# Patient Record
Sex: MALE | Employment: FULL TIME | URBAN - METROPOLITAN AREA
[De-identification: names, ages, dates, MRNs, and addresses within clinical notes are randomized per-mention and may not be internally consistent; named-entity substitution may affect disease eponyms.]

---

## 2017-07-12 ENCOUNTER — HOSPITAL ENCOUNTER (OUTPATIENT)
Dept: PHYSICAL THERAPY | Age: 32
Discharge: HOME | End: 2017-07-12
Payer: COMMERCIAL

## 2017-07-12 PROCEDURE — 97530 THERAPEUTIC ACTIVITIES: CPT

## 2017-07-12 PROCEDURE — 97162 PT EVAL MOD COMPLEX 30 MIN: CPT

## 2017-07-12 NOTE — PROGRESS NOTES
[facility] at [address] [place] [address]  Phone: [phone]   Fax: [phone]    Plan of Care/ Statement of Necessity for Physical Therapy Services     Patient name: Brenda Childress Start of Care: 2017   Referral source: Jamil Lopez MD : 1985    Medical Diagnosis: Low back pain [M54.5]   Onset Date:2017    Treatment Diagnosis: low back pain   Prior Hospitalization: see medical history Provider#: [id number]   Medications: Verified on Patient summary List    Comorbidities: diabetes   Prior Level of Function: 1-2 incidents of low back pain but not limiting like this episode beginning May 2017; hospitalized from -2016 and in medically induced coma for 3 mo with reports of atrophy and diminished health since    The Plan of Care and following information is based on the information from the initial evaluation. Assessment/ key information: Pt is a 33 y.o. male presenting to therapy with right sided sciatica and diagnosis of low back pain per MD script. Pt impairments include decreased lumbar ROM (right>left SB ROM), decreased LE strength (gluts and abd), restrictions in right hip ER/IR, tenderness to palpation of piriformis and glut, and right buttocks/posterior upper thigh pain. Neg slump and SLR testing. Pt functional limitations include pain with standing, turning in bed, walking, certain movements. Pt treatment diagnosis consistent with piriformis syndrome. Pt would benefit from skilled PT to improve impairments listed above and return pt to prior level of function of no pain with activities.      Evaluation Complexity History MEDIUM  Complexity : 1-2 comorbidities / personal factors will impact the outcome/ POC ; Examination HIGH Complexity : 4+ Standardized tests and measures addressing body structure, function, activity limitation and / or participation in recreation  ;Presentation MEDIUM Complexity : Evolving with changing characteristics ;Clinical Decision Making MEDIUM Complexity : FOTO score of 26-74  Overall Complexity Rating: MEDIUM  Problem List: pain affecting function, decrease ROM, decrease strength, impaired gait/ balance, decrease ADL/ functional abilitiies, decrease activity tolerance, decrease flexibility/ joint mobility and decrease transfer abilities   Treatment Plan may include any combination of the following: Therapeutic exercise, Therapeutic activities, Neuromuscular re-education, Physical agent/modality, Gait/balance training, Manual therapy, Patient education and Functional mobility training  Patient / Family readiness to learn indicated by: asking questions, trying to perform skills and interest  Persons(s) to be included in education: patient (P)  Barriers to Learning/Limitations: None  Patient Goal (s): Make sciatica go away. Not be bothered by pain  Patient Self Reported Health Status: fair  Rehabilitation Potential: good    Short Term Goals: STG- To be accomplished in 2 week(s):  1. Pt will be independent with HEP to encourage prophylaxis. Eval:dispensed  Current: NA     Long Term Goals: LTG- To be accomplished in 6 week(s):  1. Pt will report pain at worst does not exceed 3/10 and 50% improvement in right LE symptoms to improve quality of life. Eval:8-9/10, baseline constant right buttocks pain  Current: NA     2.  Pt will demonstrate equal and pain-free SB ROM to improve tolerance to standing and bending for ADLs and household chores. Eval:25\" from floor right and pain, 19\" from floor left and no pain  Current: NA     3.  Pt hip abd and ext strength will improve to 4+/5 to allow pt to walk with no restrictions or limiting pain. Eval:                                       Left              Right   Hip abd 4 4- pain []   Hip add    4 []   Gluteus Yaya 3+ pain 3+ pain []   Current: NA     4. Pt FOTO score will increase by >10 points to show improvement in right LE/low back function.   Eval:53  Current: will address at visit 5    Frequency / Duration: Patient to be seen 2 times per week for 6 weeks. Patient/ Caregiver education and instruction: Diagnosis, prognosis, self care, activity modification and exercises   [x]  Plan of care has been reviewed with SANTO Wisdom, PT 7/12/2017 2:15 PM  _____________________________________________________________________  I certify that the above Therapy Services are being furnished while the patient is under my care. I agree with the treatment plan and certify that this therapy is necessary.     Physician's Signature:____________________  Date:__________Time:______    Please sign and return to [facility] at [address] [place] [address]  Phone: [phone]   Fax: [phone]

## 2017-07-12 NOTE — PROGRESS NOTES
PT DAILY TREATMENT NOTE/LUMBAR EVAL 3-16    Patient Name: Giovani Schaffer  Date:2017  : 1985  [x]  Patient  Verified  Payor: Shankar Manriquez / Plan: [employer] / Product Type: HMO /    In time:218  Out time:255  Total Treatment Time (min): 37  Visit #: 1 of 12    Treatment Area: Low back pain [M54.5]  SUBJECTIVE  Pain Level (0-10 scale): 3  []constant []intermittent []improving []worsening []no change since onset    Any medication changes, allergies to medications, adverse drug reactions, diagnosis change, or new procedure performed?: [x] No    [] Yes (see summary sheet for update)  Subjective functional status/changes:     Pain:  _8-9_/10 max       _2-3_/10 min     __3__/10 now    Location: points to right buttocks and upper thigh     [X] Sharp    [ ] Frances Ludy [ ] Karn Bob [X]  Aching     [ ] Beverly Harsh [ ] Alexey Lakisha [X] Other:  feels weak      [ Rebekah Tl [X] Intermittent        Weakness/Popping/clicking/giving way- denies  Numbness/tingling? Denies  Pain at night- denies     Social/Recreation       Hobbies-       Not affected           Aggravating factors- standing, turning in bed  Relieving factors- sitting      PLOF: 1-2 incidents of low back pain but not limiting like this episode beginning May 2017  Limitations to PLOF: pain  Mechanism of Injury: Pt reports low back and right sided buttocks pain initiated following sleeping on couch for several nights in beginning of May. Traveled to [place], when running for the plane cause pain and was very painful next AM.  Pt reports constant pain since incident, gradually improving with medication. Pt reports trailing TENS, ice, heat with no improvements. Pt had XRAY, negative findings. Pt was hospitalized for 2 mo (2016), on echmo machine, medically induced coma for 3 weeks- significant deterioration and atrophy noted, required PT and speech following.      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 Not be bothered by pain\"  Barriers: []pain []financial []time []transportation []other  Motivation: good   Substance use: []Alcohol []Tobacco []other:   FABQ Score: []low []elevate  Cognition: A & O x 4    Other:    OBJECTIVE/EXAMINATION    27 min [x]Eval                  []Re-Eval       10 min Therapeutic Activity:  []  See flow sheet : Discussed and reviewed diagnosis, prognosis, POC, HEP   Rationale: increase ROM, increase strength, improve coordination, improve balance and increase proprioception  to improve the patients ability to perform ADL's with reduced pain levels. With   [] TE   [] TA   [] neuro   [] other: Patient Education: [x] Review HEP    [] Progressed/Changed HEP based on:   [] positioning   [] body mechanics   [] transfers   [] heat/ice application    [] other:      Other Objective/Functional Measures:     Physical Therapy Evaluation - Lumbar Spine ([facility])    SUBJECTIVE  Chief Complaint:    Mechanism of injury:    Symptoms:  Aggravated by:   [] Bending [] Sitting [] Standing [] Walking   [] Moving [] Cough [] Sneeze [] Valsalva   [] AM  [] PM  Lying:  [] sup   [] pro   [] sidelying   [] Other:     Eased by:    [] Bending [] Sitting [] Standing [] Walking   [] Moving [] AM  [] PM  Lying: [] sup  [] pro  [] sidelying   [] Other:     General Health:  Red Flags Indicated? [] Yes    [] No  [] Yes [] No Recent weight change (If yes, due to dieting?  [] Yes  [] No)   [] Yes [] No Weakness in legs during walking  [] Yes [] No Unremitting pain at night  [] Yes [] No Abdominal pain or problems  [] Yes [] No Rectal bleeding  [] Yes [] No Feet more cold or painful in cold weather  [] Yes [] No Menstrual irregularities  [] Yes [] No Blood or pain with urination  [] Yes [] No Dysfunction of bowel or bladder  [] Yes [] No Recent illness within past 3 weeks (i.e, cold, flu)  [] Yes [] No Numbness/tingling in buttock/genitalia region    Past History/Treatments:     Diagnostic Tests: [] Lab work [] X-rays    [] CT [] MRI     [] Other:  Results:    Functional Status  Prior level of function:  Present functional limitations:  What position do you sleep in?:    OBJECTIVE  Posture:  Lateral Shift: [] R    [] L     [] +  [] -  Kyphosis: [] Increased [x] Decreased   []  WNL  Lordosis:  [] Increased [x] Decreased   [] WNL  Pelvic symmetry: [] WNL    [] Other:    Gait:  [] Normal     [] Abnormal:    Active Movements: [] N/A   [] Too acute   [] Other:  ROM % AROM % PROM Comments:pain, area   Forward flexion 40-60 4\"     Extension 20-30 50%     SB right 20-30 25\"  pain   SB left 20-30 19\"     Rotation right 5-10      Rotation left 5-10        Repeated Movements   Effects on present pain: produces (TX), abolishes (A), increases (incr), decreases (decr), centralizes (C), peripheral (PH), no effect (NE)   Pre-Test Sx Flexion Repeated Flexion Extension Repeated Extension Repeated SBL Repeated SBR   Sitting          Standing          Lying      N/A N/A   Comments:  Side Glide:  Sustained passive positioning test:    Neuro Screen [x] WNL  Myotome/Dermatome/Reflexes:  Comments:    Dural Mobility:  SLR Supine: [] R    [x] L    [] +    [x] -  @ (degrees):   Slump Test: [x] R    [x] L    [] +    [x] -  @ (degrees):   Prone Knee Bend: [] R    [] L    [] +    [] -     Palpation  [] Min  [x] Mod  [] Severe    Location: piriformis, glut on right side  [] Min  [] Mod  [] Severe    Location:  [] Min  [] Mod  [] Severe    Location:    Strength   L(0-5) R (0-5) N/T   Hip Flexion (L1,2) 4+ 4+ []   Knee Extension (L3,4) 4+ 4+ []   Ankle Dorsiflexion (L4) 5 5 []   Great Toe Extension (L5)   []   Ankle Plantarflexion (S1) 5 5 []   Knee Flexion (S1,2) 4 4 []   Upper Abdominals   []   Lower Abdominals   []   Hip abd 4 4- pain []   Hip add  4 []   Gluteus Yaya 3+ pain 3+ pain []   Other   []     Special Tests  Lumbar: Lumb. Compression: [] Pos  [] Neg               Lumbar Distraction:   [] Pos  [] Neg    Quadrant:  [] Pos  [] Neg   [] Flex  [] Ext    Sacroilliac:  Gaenslen's: [] R    [] L    [] +    [] -     Compression: [] +    [] -     Gapping:  [] +    [] -     Thigh Thrust: [] R    [] L    [] +    [] -     Leg Length: [] +    [] -   Position:    Crests:    ASIS:    PSIS:    Sacral Sulcus:    Mobility: Standing flex:     Sitting flex:     Supine to sit:     Prone knee bend:         Hip: Jhonny Saner:  [] R    [] L    [] +    [] -     Scour:  [] R    [] L    [] +    [] -     Piriformis: [] R    [] L    [] +    [] -          Deficits: Klaudia's: [] R    [] L    [] +    [] -     Fer: [] R    [] L    [] +    [] -     Hamstrings 90/90:    Gastrocsoleus (to neutral): Right: Left:       Global Muscular Weakness:  Abdominals:  Quadratus Lumborum:  Paraspinals: Other:     Other tests/comments: ER/IR left hip 29/18    ER/IR right hip 43/22    Pain Level (0-10 scale) post treatment: 4    ASSESSMENT/Changes in Function: Pt is a 33 y.o. male presenting to therapy with right sided sciatica and diagnosis of low back pain per MD script. Pt impairments include decreased lumbar ROM (right>left SB ROM), decreased LE strength (gluts and abd), restrictions in right hip ER/IR, tenderness to palpation of piriformis and glut. Neg slump and SLR testing. Pt functional limitations include pain with standing, turning in bed, walking, certain sharp movements. Pt treatment diagnosis consistent with piriformis syndrome. Pt would benefit from skilled PT to improve impairments listed above and return pt to prior level of function of no pain with activities.      Patient will continue to benefit from skilled PT services to modify and progress therapeutic interventions, address functional mobility deficits, address ROM deficits, address strength deficits, analyze and address soft tissue restrictions, analyze and cue movement patterns, analyze and modify body mechanics/ergonomics, assess and modify postural abnormalities and instruct in home and community integration to attain remaining goals. [x]  See Plan of Care  []  See progress note/recertification  []  See Discharge Summary         Progress towards goals / Updated goals:  Short Term Goals: STG- To be accomplished in 2 week(s):  1. Pt will be independent with HEP to encourage prophylaxis. Eval:dispensed  Current: NA    Long Term Goals: LTG- To be accomplished in 6 week(s):  1. Pt will report pain at worst does not exceed 3/10 and 50% improvement in right LE symptoms to improve quality of life. Eval:8-9/10, baseline constant right buttocks pain  Current: NA    2. Pt will demonstrate equal and pain-free SB ROM to improve tolerance to standing and bending for ADLs and household chores. Eval:25\" from floor right and pain, 19\" from floor left and no pain  Current: NA    3. Pt hip abd and ext strength will improve to 4+/5 to allow pt to walk with no restrictions or limiting pain. Eval:                                       Left              Right   Hip abd 4 4- pain []   Hip add   4 []   Gluteus Yaya 3+ pain 3+ pain []   Current: NA    4. Pt FOTO score will increase by >10 points to show improvement in right LE/low back function.   Eval:53  Current: will address at visit 5    PLAN  [x]  Upgrade activities as tolerated     [x]  Continue plan of care  []  Update interventions per flow sheet       []  Discharge due to:_  []  Other:_      Lo Steen, PT 7/12/2017  2:15 PM

## 2017-07-14 ENCOUNTER — HOSPITAL ENCOUNTER (OUTPATIENT)
Dept: PHYSICAL THERAPY | Age: 32
Discharge: HOME | End: 2017-07-14
Payer: COMMERCIAL

## 2017-07-14 PROCEDURE — 97112 NEUROMUSCULAR REEDUCATION: CPT

## 2017-07-14 PROCEDURE — 97110 THERAPEUTIC EXERCISES: CPT

## 2017-07-14 NOTE — PROGRESS NOTES
PT DAILY TREATMENT NOTE -    Patient Name: Pj Velasquez  Date:2017  : 1985  [x]  Patient  Verified  Payor: Dandy Leandro / Plan: [address] [place] [address] [place] [employer] / Product Type: HMO /    In time:101  Out time:139  Total Treatment Time (min): 38  Visit #: 2 of 12    Treatment Area: Low back pain [M54.5]    SUBJECTIVE  Pain Level (0-10 scale): 4-5  Any medication changes, allergies to medications, adverse drug reactions, diagnosis change, or new procedure performed?: [x] No    [] Yes (see summary sheet for update)  Subjective functional status/changes:   [] No changes reported  \"It's a little higher today. \"    OBJECTIVE      23 min Therapeutic Exercise:  [x] See flow sheet :   Rationale: increase ROM, increase strength, improve coordination, improve balance and increase proprioception to improve the patients ability to perform ADL's with reduced pain levels. 15 min Neuromuscular Re-education:  [x]  See flow sheet :   Rationale: increase ROM, increase strength, improve coordination, improve balance and increase proprioception  to improve the patients ability to perform ADL's with reduced pain levels. With   [] TE   [] TA   [] neuro   [] other: Patient Education: [x] Review HEP    [] Progressed/Changed HEP based on:   [] positioning   [] body mechanics   [] transfers   [] heat/ice application    [] other:      Other Objective/Functional Measures: challenged with PPT     Pain Level (0-10 scale) post treatment: 2    ASSESSMENT/Changes in Function: Pt tolerated first follow up well. Reported appropriate muscle fatigue and facilitation with clams.       Patient will continue to benefit from skilled PT services to modify and progress therapeutic interventions, address functional mobility deficits, address ROM deficits, address strength deficits, analyze and address soft tissue restrictions, analyze and cue movement patterns, analyze and modify body mechanics/ergonomics, assess and modify postural abnormalities and instruct in home and community integration to attain remaining goals. []  See Plan of Care  []  See progress note/recertification  []  See Discharge Summary         Progress towards goals / Updated goals:  Short Term Goals: STG- To be accomplished in 2 week(s):  1.  Pt will be independent with HEP to encourage prophylaxis. Eval:dispensed  Current: reports compliance-- progressing      Long Term Goals: LTG- To be accomplished in 6 week(s):  1.  Pt will report pain at worst does not exceed 3/10 and 50% improvement in right LE symptoms to improve quality of life. Eval:8-9/10, baseline constant right buttocks pain  Current: NA      2.  Pt will demonstrate equal and pain-free SB ROM to improve tolerance to standing and bending for ADLs and household chores. Eval:25\" from floor right and pain, 19\" from floor left and no pain  Current: NA      3.  Pt hip abd and ext strength will improve to 4+/5 to allow pt to walk with no restrictions or limiting pain. Eval:                                       Left              TIBVA   Hip abd 4 4- pain []   Hip add    4 []   Gluteus Yaya 3+ pain 3+ pain []   Current: NA      4.  Pt FOTO score will increase by >10 points to show improvement in right LE/low back function.   Eval:53  Current: will address at visit 5      PLAN  [x]  Upgrade activities as tolerated     []  Continue plan of care  []  Update interventions per flow sheet       []  Discharge due to:_  []  Other:_      Atilio Wagoner PT 7/14/2017  1:08 PM    Future Appointments  Date Time Provider Matilda Avila   7/19/2017 2:30 PM DIANA Hewitt [facility]   7/21/2017 12:30 PM Michelle Hewitt [facility]   7/26/2017 2:00 PM Michelle Hewitt [facility]   7/28/2017 1:00 PM DIANA Hewitt [facility]   8/2/2017 2:00 PM Michelle Hewitt [facility]   8/4/2017 12:30 PM Lisa Eubanks [facility]

## 2017-07-19 ENCOUNTER — HOSPITAL ENCOUNTER (OUTPATIENT)
Dept: PHYSICAL THERAPY | Age: 32
End: 2017-07-19
Payer: COMMERCIAL

## 2017-07-21 ENCOUNTER — HOSPITAL ENCOUNTER (OUTPATIENT)
Dept: PHYSICAL THERAPY | Age: 32
Discharge: HOME | End: 2017-07-21
Payer: COMMERCIAL

## 2017-07-21 PROCEDURE — 97110 THERAPEUTIC EXERCISES: CPT

## 2017-07-21 PROCEDURE — 97112 NEUROMUSCULAR REEDUCATION: CPT

## 2017-07-21 NOTE — PROGRESS NOTES
PT DAILY TREATMENT NOTE 12-    Patient Name: Dominag Horn  Date:2017  : 1985  [x]  Patient  Verified  Payor: Marichuy Going / Plan: [employer] / Product Type: HMO /    In time:1231  Out time:110  Total Treatment Time (min): 39  Visit #: 3 of 12    Treatment Area: Low back pain [M54.5]    SUBJECTIVE  Pain Level (0-10 scale): 2  Any medication changes, allergies to medications, adverse drug reactions, diagnosis change, or new procedure performed?: [x] No    [] Yes (see summary sheet for update)  Subjective functional status/changes:   [] No changes reported  \"I had flu like symptoms, but no fever. I am feeling better. \"    OBJECTIVE    29 min Therapeutic Exercise:  [x] See flow sheet :   Rationale: increase ROM, increase strength, improve coordination, improve balance and increase proprioception to improve the patients ability to perform ADL's with reduced pain levels. 10 min Neuromuscular Re-education:  []  See flow sheet :tactile cuing for PPT and TA 1-3   Rationale: increase ROM, increase strength, improve coordination, improve balance and increase proprioception  to improve the patients ability to perform ADL's with reduced pain levels. With   [] TE   [] TA   [] neuro   [] other: Patient Education: [x] Review HEP    [] Progressed/Changed HEP based on:   [] positioning   [] body mechanics   [] transfers   [] heat/ice application    [] other:      Other Objective/Functional Measures:      Pain Level (0-10 scale) post treatment: 3    ASSESSMENT/Changes in Function: Pt challenged with session. Tactile cue needed to perform PPT.     Patient will continue to benefit from skilled PT services to modify and progress therapeutic interventions, address functional mobility deficits, address ROM deficits, address strength deficits, analyze and address soft tissue restrictions, analyze and cue movement patterns, analyze and modify body mechanics/ergonomics, assess and modify postural abnormalities and instruct in home and community integration to attain remaining goals. []  See Plan of Care  []  See progress note/recertification  []  See Discharge Summary         Progress towards goals / Updated goals:  Short Term Goals: STG- To be accomplished in 2 week(s):  1.  Pt will be independent with HEP to encourage prophylaxis. Eval:dispensed  Current: reports compliance-- progressing      Long Term Goals: LTG- To be accomplished in 6 week(s):  1.  Pt will report pain at worst does not exceed 3/10 and 50% improvement in right LE symptoms to improve quality of life. Eval:8-9/10, baseline constant right buttocks pain  Current: improving entry and exit pain levels-- progressing      2.  Pt will demonstrate equal and pain-free SB ROM to improve tolerance to standing and bending for ADLs and household chores. Eval:25\" from floor right and pain, 19\" from floor left and no pain  Current: NA      3.  Pt hip abd and ext strength will improve to 4+/5 to allow pt to walk with no restrictions or limiting pain. Eval:                                       Left              CLPXF   Hip abd 4 4- pain []   Hip add    4 []   Gluteus Yaya 3+ pain 3+ pain []   Current: NA      4.  Pt FOTO score will increase by >10 points to show improvement in right LE/low back function.   Eval:53  Current: will address at visit 5      PLAN  []  Upgrade activities as tolerated     [x]  Continue plan of care  []  Update interventions per flow sheet       []  Discharge due to:_  []  Other:_      Rodrick Delgadillo PT 7/21/2017  12:34 PM    Future Appointments  Date Time Provider Matilda Avila   7/26/2017 2:00 PM Rodrick Delgadillo PT [facility]   7/28/2017 1:00 PM Michelle Cardoza [facility]   8/2/2017 2:00 PM Michelle Cardoza [facility]   8/4/2017 12:30 PM Merary Azul [facility]

## 2017-07-26 ENCOUNTER — HOSPITAL ENCOUNTER (OUTPATIENT)
Dept: PHYSICAL THERAPY | Age: 32
Discharge: HOME | End: 2017-07-26
Payer: COMMERCIAL

## 2017-07-26 PROCEDURE — 97112 NEUROMUSCULAR REEDUCATION: CPT

## 2017-07-26 PROCEDURE — 97110 THERAPEUTIC EXERCISES: CPT

## 2017-07-26 NOTE — PROGRESS NOTES
PT DAILY TREATMENT NOTE     Patient Name: Beth Montgomery  Date:2017  : 1985  [x]  Patient  Verified  Payor: Fredis Hickman / Plan: [employer] / Product Type: HMO /    In time:1405  Out time:1455  Total Treatment Time (min): 50  Visit #: 4 of 12    Treatment Area: Low back pain [M54.5]    SUBJECTIVE  Pain Level (0-10 scale): 2-3/10  Any medication changes, allergies to medications, adverse drug reactions, diagnosis change, or new procedure performed?: [x] No    [] Yes (see summary sheet for update)  Subjective functional status/changes:   [x] No changes reported      OBJECTIVE    Modality rationale: decrease inflammation and decrease pain to improve the patients ability to improve ADL's   Min Type Additional Details    [] Estim:  []Unatt       []IFC  []Premod                        []Other:  []w/ice   []w/heat  Position:  Location:    [] Estim: []Att    []TENS instruct  []NMES                    []Other:  []w/US   []w/ice   []w/heat  Position:  Location:    []  Traction: [] Cervical       []Lumbar                       [] Prone          []Supine                       []Intermittent   []Continuous Lbs:  [] before manual  [] after manual    []  Ultrasound: []Continuous   [] Pulsed                           []1MHz   []3MHz W/cm2:  Location:    []  Iontophoresis with dexamethasone         Location: [] Take home patch   [] In clinic   10 [x]  Ice     []  heat  []  Ice massage  []  Laser   []  Anodyne Position:HL  Location:L/S    []  Laser with stim  []  Other:  Position:  Location:    []  Vasopneumatic Device Pressure:       [] lo [] med [] hi   Temperature: [] lo [] med [] hi   [] Skin assessment post-treatment:  []intact []redness- no adverse reaction    []redness - adverse reaction:      min []Eval                  []Re-Eval       30 min Therapeutic Exercise:  [x] See flow sheet :   Rationale: increase ROM and increase strength to improve the patients ability to mprove ADL's     min Therapeutic Activity: []  See flow sheet :        10 min Neuromuscular Re-education:  [x]  See flow sheet :lumbar stabilization   Rationale: increase strength, improve coordination and increase proprioception  to improve the patients ability to improve ADL's     min Manual Therapy:          min Gait Training:  ___ feet with ___ device on level surfaces with ___ level of assist   Rationale: With   [] TE   [] TA   [] neuro   [] other: Patient Education: [x] Review HEP    [] Progressed/Changed HEP based on:   [] positioning   [] body mechanics   [] transfers   [x] heat/ice application    [] other:      Other Objective/Functional Measures:      Pain Level (0-10 scale) post treatment: 2-3/10    ASSESSMENT/Changes in Function: No new changes. Pt needed less cueing for lumbar stabilization therex today. Patient will continue to benefit from skilled PT services to modify and progress therapeutic interventions, address ROM deficits, address strength deficits, analyze and address soft tissue restrictions, analyze and cue movement patterns, analyze and modify body mechanics/ergonomics and instruct in home and community integration to attain remaining goals. [x]  See Plan of Care  []  See progress note/recertification  []  See Discharge Summary         Progress towards goals / Updated goals:  Short Term Goals: STG- To be accomplished in 2 week(s):  1.  Pt will be independent with HEP to encourage prophylaxis. Eval:dispensed  Current: reports compliance-- progressing      Long Term Goals: LTG- To be accomplished in 6 week(s):  1.  Pt will report pain at worst does not exceed 3/10 and 50% improvement in right LE symptoms to improve quality of life. Eval:8-9/10, baseline constant right buttocks pain  Current: improving entry and exit pain levels-- progressing      2.  Pt will demonstrate equal and pain-free SB ROM to improve tolerance to standing and bending for ADLs and household chores.   Eval:25\" from floor right and pain, 19\" from floor left and no pain  Current: NA      3.  Pt hip abd and ext strength will improve to 4+/5 to allow pt to walk with no restrictions or limiting pain. Eval:                                       Left              RBVPE   Hip abd 4 4- pain []   Hip add    4 []   Gluteus Yaya 3+ pain 3+ pain []   Current: NA      4.  Pt FOTO score will increase by >10 points to show improvement in right LE/low back function.   Eval:53  Current: will address at visit 5         PLAN  [x]  Upgrade activities as tolerated     [x]  Continue plan of care  []  Update interventions per flow sheet       []  Discharge due to:_  []  Other:_      Ej Palma, SANTO 7/26/2017  2:03 PM    Future Appointments  Date Time Provider Matilda Avila   7/28/2017 1:00 PM Michelle Bassett [facility]   8/2/2017 2:00 PM Michelle Bassett [facility]   8/4/2017 12:30 PM Pushpa Rivero [facility]

## 2017-07-28 ENCOUNTER — HOSPITAL ENCOUNTER (OUTPATIENT)
Dept: PHYSICAL THERAPY | Age: 32
Discharge: HOME | End: 2017-07-28
Payer: COMMERCIAL

## 2017-07-28 PROCEDURE — 97110 THERAPEUTIC EXERCISES: CPT

## 2017-07-28 PROCEDURE — 97112 NEUROMUSCULAR REEDUCATION: CPT

## 2017-07-28 NOTE — PROGRESS NOTES
PT DAILY TREATMENT NOTE 12    Patient Name: Wilner Madrigal  Date:2017  : 1985  [x]  Patient  Verified  Payor: Andres Maury / Plan: [employer] [place] [employer] / Product Type: HMO /    In time:104  Out time:150  Total Treatment Time (min): 46  Visit #: 5 of 12    Treatment Area: Low back pain [M54.5]    SUBJECTIVE  Pain Level (0-10 scale): 1-2  Any medication changes, allergies to medications, adverse drug reactions, diagnosis change, or new procedure performed?: [x] No    [] Yes (see summary sheet for update)  Subjective functional status/changes:   [] No changes reported  \"It feels pretty good. \"    OBJECTIVE    Modality rationale: decrease inflammation and decrease pain to improve the patients ability to perform ADL's with reduced pain levels.     Min Type Additional Details    [] Estim:  []Unatt       []IFC  []Premod                        []Other:  []w/ice   []w/heat  Position:  Location:    [] Estim: []Att    []TENS instruct  []NMES                    []Other:  []w/US   []w/ice   []w/heat  Position:  Location:    []  Traction: [] Cervical       []Lumbar                       [] Prone          []Supine                       []Intermittent   []Continuous Lbs:  [] before manual  [] after manual    []  Ultrasound: []Continuous   [] Pulsed                           []1MHz   []3MHz W/cm2:  Location:    []  Iontophoresis with dexamethasone         Location: [] Take home patch   [] In clinic   10 [x]  Ice     []  heat  []  Ice massage  []  Laser   []  Anodyne Position: supine  Location:l-spine    []  Laser with stim  []  Other:  Position:  Location:    []  Vasopneumatic Device Pressure:       [] lo [] med [] hi   Temperature: [] lo [] med [] hi   [x] Skin assessment post-treatment:  [x]intact []redness- no adverse reaction    []redness - adverse reaction:     28 min Therapeutic Exercise:  [x] See flow sheet :   Rationale: increase ROM, increase strength, improve coordination, improve balance and increase proprioception to improve the patients ability to perform ADL's with reduced pain levels. 10 min Neuromuscular Re-education:  [x]  See flow sheet :   Rationale: increase ROM, increase strength, improve coordination, improve balance and increase proprioception  to improve the patients ability to perform ADL's with reduced pain levels. With   [] TE   [] TA   [] neuro   [] other: Patient Education: [x] Review HEP    [] Progressed/Changed HEP based on:   [] positioning   [] body mechanics   [] transfers   [] heat/ice application    [] other:      Other Objective/Functional Measures: FOTO 63     Pain Level (0-10 scale) post treatment: 0    ASSESSMENT/Changes in Function: Added q-ped activities with report of significant challenge but no pain. Progressed resistance with good tolerance. Patient will continue to benefit from skilled PT services to modify and progress therapeutic interventions, address functional mobility deficits, address ROM deficits, address strength deficits, analyze and address soft tissue restrictions, analyze and cue movement patterns, analyze and modify body mechanics/ergonomics, assess and modify postural abnormalities and instruct in home and community integration to attain remaining goals. []  See Plan of Care  []  See progress note/recertification  []  See Discharge Summary         Progress towards goals / Updated goals:  Short Term Goals: STG- To be accomplished in 2 week(s):  1.  Pt will be independent with HEP to encourage prophylaxis. Eval:dispensed  Current: reports compliance-- progressing      Long Term Goals: LTG- To be accomplished in 6 week(s):  1.  Pt will report pain at worst does not exceed 3/10 and 50% improvement in right LE symptoms to improve quality of life.   Eval:8-9/10, baseline constant right buttocks pain  Current: improving entry and exit pain levels-- progressing      2.  Pt will demonstrate equal and pain-free SB ROM to improve tolerance to standing and bending for ADLs and household chores. Eval:25\" from floor right and pain, 19\" from floor left and no pain  Current: NA      3.  Pt hip abd and ext strength will improve to 4+/5 to allow pt to walk with no restrictions or limiting pain. Eval:                                       Left              PUKOU   Hip abd 4 4- pain []   Hip add    4 []   Gluteus Yaya 3+ pain 3+ pain []   Current: NA      4.  Pt FOTO score will increase by >10 points to show improvement in right LE/low back function.   Eval:53  Current: 63--MET      PLAN  [x]  Upgrade activities as tolerated     [x]  Continue plan of care  []  Update interventions per flow sheet       []  Discharge due to:_  []  Other:_      Sage Pierce PT 7/28/2017  1:12 PM    Future Appointments  Date Time Provider Matilda Avila   8/2/2017 2:00 PM Michelle [facility]cd[facility]   8/4/2017 12:30 PM [address] [place] [address]

## 2017-08-02 ENCOUNTER — APPOINTMENT (OUTPATIENT)
Dept: PHYSICAL THERAPY | Age: 32
End: 2017-08-02

## 2017-08-04 ENCOUNTER — HOSPITAL ENCOUNTER (OUTPATIENT)
Dept: PHYSICAL THERAPY | Age: 32
End: 2017-08-04

## 2017-09-19 NOTE — PROGRESS NOTES
[facility] at [address] [place] [address]  Phone: [phone]   Fax: [phone]    Discharge Summary    Patient name: Jhonny Yuna     Start of Care: 17  Referral source: Malen Gosselin, MD    : 1985  Medical/Treatment Diagnosis: Low back pain [M54.5]  Onset Date:2017  Prior Hospitalization: see medical history   Provider#: [id number]  Comorbidities: diabetes   Prior Level of Function: 1-2 incidents of low back pain but not limiting like this episode beginning May 2017; hospitalized from -2016 and in medically induced coma for 3 mo with reports of atrophy and diminished health since  Medications: Verified on Patient Summary List    Visits from Start of Care: 5    Missed Visits: 3  Reporting Period : 17 to 17    Short Term Goals: STG- To be accomplished in 2 week(s):  1.  Pt will be independent with HEP to encourage prophylaxis. Eval:dispensed  Current: reports compliance-- progressing      Long Term Goals: LTG- To be accomplished in 6 week(s):  1.  Pt will report pain at worst does not exceed 3/10 and 50% improvement in right LE symptoms to improve quality of life. Eval:8-9/10, baseline constant right buttocks pain  Current: improving entry and exit pain levels-- progressing      2.  Pt will demonstrate equal and pain-free SB ROM to improve tolerance to standing and bending for ADLs and household chores. Eval:25\" from floor right and pain, 19\" from floor left and no pain  Current: NA      3.  Pt hip abd and ext strength will improve to 4+/5 to allow pt to walk with no restrictions or limiting pain. Eval:                                       Left              RYJJQ   Hip abd 4 4- pain []   Hip add    4 []   Gluteus Yaya 3+ pain 3+ pain []   Current: NA      4.  Pt FOTO score will increase by >10 points to show improvement in right LE/low back function.   Eval:53  Current: 63--MET    Assessment/ Summary of Care: Physical therapy consisted of exercises to target low back pain and improve function. Treatment strategies included therapeutic exercises, therapeutic activity, neuromuscular re-education, patient education to improve tolerance to work duties, age appropriate activities and ADLs. Pt performed fairly well in therapy, however limited attendance to assess goals and progress. Due to pt not being seen in >30 days, pt to be discharged at this time.      RECOMMENDATIONS:  [x]Discontinue therapy: []Patient has reached or is progressing toward set goals      [x]Patient is non-compliant or has abdicated      []Due to lack of appreciable progress towards set goals    Cristin Trotter, PT 9/19/2017 9:27 AM